# Patient Record
Sex: FEMALE | Race: BLACK OR AFRICAN AMERICAN | Employment: PART TIME | URBAN - METROPOLITAN AREA
[De-identification: names, ages, dates, MRNs, and addresses within clinical notes are randomized per-mention and may not be internally consistent; named-entity substitution may affect disease eponyms.]

---

## 2017-02-24 ENCOUNTER — OFFICE VISIT (OUTPATIENT)
Dept: OPHTHALMOLOGY | Facility: CLINIC | Age: 37
End: 2017-02-24

## 2017-02-24 DIAGNOSIS — H40.1131 PRIMARY OPEN ANGLE GLAUCOMA OF BOTH EYES, MILD STAGE: ICD-10-CM

## 2017-02-24 DIAGNOSIS — H52.13 MYOPIA, BILATERAL: Primary | ICD-10-CM

## 2017-02-24 ASSESSMENT — GONIOSCOPY
OD_INFERIOR: 4
OD_SUPERIOR: 4
OD_TEMPORAL: 4
OD_NASAL: 4
ADDITIONAL_COMMENTS: TR TMP OU
OS_NASAL: 4
OS_SUPERIOR: 4
OS_INFERIOR: 4
OS_TEMPORAL: 4

## 2017-02-24 ASSESSMENT — VISUAL ACUITY
OD_CC+: -2
OD_SC: 20/25
METHOD: SNELLEN - LINEAR
METHOD: SNELLEN - LINEAR
OS_SC+: -2
OS_CC: 20/20
OS_SC: 20/20
OD_CC: 20/20

## 2017-02-24 ASSESSMENT — CONF VISUAL FIELD
METHOD: COUNTING FINGERS
OD_NORMAL: 1
OS_NORMAL: 1

## 2017-02-24 ASSESSMENT — REFRACTION_MANIFEST
OS_AXIS: 180
OD_CYLINDER: +0.25
OS_CYLINDER: +0.25
OD_AXIS: 180
OS_SPHERE: -0.50
OD_SPHERE: -1.00

## 2017-02-24 ASSESSMENT — EXTERNAL EXAM - LEFT EYE: OS_EXAM: NORMAL

## 2017-02-24 ASSESSMENT — TONOMETRY
OD_IOP_MMHG: 22
OS_IOP_MMHG: 21
IOP_METHOD: APPLANATION

## 2017-02-24 ASSESSMENT — SLIT LAMP EXAM - LIDS
COMMENTS: NORMAL
COMMENTS: NORMAL

## 2017-02-24 ASSESSMENT — PACHYMETRY
OS_CT(UM): 604
OD_CT(UM): 612

## 2017-02-24 ASSESSMENT — EXTERNAL EXAM - RIGHT EYE: OD_EXAM: NORMAL

## 2017-02-24 ASSESSMENT — CUP TO DISC RATIO
OS_RATIO: 0.25
OD_RATIO: 0.25

## 2017-02-24 NOTE — PATIENT INSTRUCTIONS
Patient will return to clinic in 8-12 months with repeat visual field test, dilated eye exam and OCT with RNFL analysis.

## 2017-02-24 NOTE — MR AVS SNAPSHOT
After Visit Summary   2017    Ailyn Dennis    MRN: [id number]           Patient Information     Date Of Birth          1980        Visit Information        Provider Department      2017 9:15 AM Ro House MD [facility]        Today's Diagnoses     Myopia, bilateral    -  1    Primary open angle glaucoma of both eyes, mild stage          Care Instructions    Patient will return to clinic in 8-12 months with repeat visual field test, dilated eye exam and OCT with RNFL analysis.          Follow-ups after your visit        Who to contact     Please call your clinic at [phone] to:    Ask questions about your health    Make or cancel appointments    Discuss your medicines    Learn about your test results    Speak to your doctor   If you have compliments or concerns about an experience at your clinic, or if you wish to file a complaint, please contact [facility] Patient Relations at [phone] or email us at [email or web address]         Additional Information About Your Visit        MyChart Information     Beyond[employer] is an electronic gateway that provides easy, online access to your medical records. With BeyondCore, you can request a clinic appointment, read your test results, renew a prescription or communicate with your care team.     To sign up for BeyondCore visit the website at [email or web address]   You will be asked to enter the access code listed below, as well as some personal information. Please follow the directions to create your username and password.     Your access code is: [email or web address]  Expires: 2017 11:01 AM     Your access code will  in 90 days. If you need help or a new code, please contact your [facility] or call [phone] for assistance.        Care EveryWhere ID     This is your Care EveryWhere ID. This could be used by other organizations to  access your [place] medical records  [id number]         Blood Pressure from Last 3 Encounters:   No data found for BP    Weight from Last 3 Encounters:   No data found for Wt              We Performed the Following     GONIOSCOPY     OCT Optic Nerve RNFL Spectralis OU (both eyes)     OVF 24-2 Dynamic OU     Pachymetry OU (both eyes)     REFRACTION        Primary Care Provider Office Phone # Fax #    Luigi Lujan [phone]       [facility] [address] [facility] [address]        Thank you!     Thank you for choosing [facility]  for your care. Our goal is always to provide you with excellent care. Hearing back from our patients is one way we can continue to improve our services. Please take a few minutes to complete the written survey that you may receive in the mail after your visit with us. Thank you!             Your Updated Medication List - Protect others around you: Learn how to safely use, store and throw away your medicines at www.disposemymeds.org.          This list is accurate as of: 2/24/17 12:41 PM.  Always use your most recent med list.                   Brand Name Dispense Instructions for use    dorzolamide 2 % ophthalmic solution    TRUSOPT     1 drop 2 times daily       dorzolamide-timolol 2-0.5 % ophthalmic solution    COSOPT    1 Bottle    Place 1 drop into both eyes 2 times daily       GABAPENTIN PO          hydrochlorothiazide 12.5 MG capsule    MICROZIDE     Take 12.5 mg by mouth daily       IMITREX PO          latanoprost 0.005 % ophthalmic solution    XALATAN    1.5 mL    Place 1 drop into both eyes At Bedtime       TRAZODONE HCL PO

## 2017-02-24 NOTE — PROGRESS NOTES
1)POAG -- s/p SLT OU in 2014, H/O noncompliance with visits and drops  -- K pachy: 612/604   Tmax:30/29     HVF: Full      CDR:  0.25/0.25   HRT/OCT:RNFL WNL       FHX of Glc:father -- vision loss from glaucoma -- on gtts      Gonio: open      Intolerant to:      Asthma/COPD:No   Steroid Use: No    Kidney Stones: No    Sulfa Allergy:  No    IOP targets:L20s -- IOP good off drops     Patient will return to clinic in 8-12 months with repeat visual field test, dilated eye exam and OCT with RNFL analysis.  Dicussed importance of glaucoma surveillance.        Attending Physician Attestation:  Complete documentation of historical and exam elements from today's encounter can be found in the full encounter summary report (not reduplicated in this progress note). I personally obtained the chief complaint(s) and history of present illness.  I confirmed and edited as necessary the review of systems, past medical/surgical history, family history, social history, and examination findings as documented by others; and I examined the patient myself. I personally reviewed the relevant tests, images, and reports as documented above. I formulated and edited as necessary the assessment and plan and discussed the findings and management plan with the patient and family.  - Ro House MD 3:41 PM 3/29/2017

## 2017-02-24 NOTE — NURSING NOTE
Chief Complaints and History of Present Illnesses   Patient presents with     Glaucoma Follow Up     Glaucoma follow up     HPI    Symptoms:     No blurred vision   Floaters (Comment: maybe in RE)   No flashes   Itching (Comment: mild)            Comments:  Glaucoma follow up.  Glasses broken need to replace.  RE feels strained and weak.  Hasn't used glaucoma drops for a few months; hadn't been seen for a few years so needed exam today.    Gisel COLEMAN 10:13 AM 02/24/2017

## 2017-06-07 ENCOUNTER — TELEPHONE (OUTPATIENT)
Dept: OPHTHALMOLOGY | Facility: CLINIC | Age: 37
End: 2017-06-07

## 2017-06-07 NOTE — TELEPHONE ENCOUNTER
Patient calls to report eye discharge, pain x 2 days. Reports vision is slightly blurry. The discharge is stringy, and she noticed a tender/hard nodule on her eyelid. Discussed using warm compresses for symptom relief. Will have patient come to clinic tomorrow.     Angel Dash MD

## 2017-06-08 ENCOUNTER — OFFICE VISIT (OUTPATIENT)
Dept: OPHTHALMOLOGY | Facility: CLINIC | Age: 37
End: 2017-06-08

## 2017-06-08 DIAGNOSIS — H00.024 HORDEOLUM INTERNUM OF LEFT UPPER EYELID: Primary | ICD-10-CM

## 2017-06-08 RX ORDER — NARATRIPTAN 1 MG/1
TABLET ORAL
COMMUNITY
Start: 2017-04-20

## 2017-06-08 RX ORDER — AMITRIPTYLINE HYDROCHLORIDE 50 MG/1
100 TABLET ORAL
COMMUNITY
Start: 2017-03-15

## 2017-06-08 RX ORDER — HYDROCHLOROTHIAZIDE 25 MG/1
25 TABLET ORAL
COMMUNITY
Start: 2014-10-20

## 2017-06-08 RX ORDER — SUMATRIPTAN 25 MG/1
25 TABLET, FILM COATED ORAL
COMMUNITY
Start: 2014-06-25

## 2017-06-08 RX ORDER — TRAZODONE HYDROCHLORIDE 50 MG/1
25-50 TABLET, FILM COATED ORAL
COMMUNITY
Start: 2016-09-27

## 2017-06-08 ASSESSMENT — VISUAL ACUITY
OD_SC: 20/20
OS_SC: 20/20
METHOD: SNELLEN - LINEAR

## 2017-06-08 ASSESSMENT — TONOMETRY
OD_IOP_MMHG: 21
OS_IOP_MMHG: 19
IOP_METHOD: ICARE

## 2017-06-08 ASSESSMENT — EXTERNAL EXAM - LEFT EYE: OS_EXAM: NORMAL

## 2017-06-08 ASSESSMENT — CONF VISUAL FIELD
OS_NORMAL: 1
OD_NORMAL: 1

## 2017-06-08 ASSESSMENT — SLIT LAMP EXAM - LIDS: COMMENTS: 1+ MGD

## 2017-06-08 ASSESSMENT — CUP TO DISC RATIO
OS_RATIO: 0.25
OD_RATIO: 0.25

## 2017-06-08 ASSESSMENT — EXTERNAL EXAM - RIGHT EYE: OD_EXAM: NORMAL

## 2017-06-08 NOTE — MR AVS SNAPSHOT
After Visit Summary   2017    Ailyn Dennis    MRN: [id number]           Patient Information     Date Of Birth          1980        Visit Information        Provider Department      2017 1:20 PM Mateo Alexander OD [facility]        Today's Diagnoses     Hordeolum internum of left upper eyelid    -  1       Follow-ups after your visit        Follow-up notes from your care team     Return in about 1 month (around 2017), or if symptoms worsen or fail to improve, for Follow Up.      Who to contact     Please call your clinic at [phone] to:    Ask questions about your health    Make or cancel appointments    Discuss your medicines    Learn about your test results    Speak to your doctor   If you have compliments or concerns about an experience at your clinic, or if you wish to file a complaint, please contact [facility] Patient Relations at [phone] or email us at [email or web address]         Additional Information About Your Visit        MyChart Information     [employer] is an electronic gateway that provides easy, online access to your medical records. With [employer], you can request a clinic appointment, read your test results, renew a prescription or communicate with your care team.     To sign up for BAROnovat visit the website at [email or web address]   You will be asked to enter the access code listed below, as well as some personal information. Please follow the directions to create your username and password.     Your access code is: [email or web address]  Expires: 2017  2:15 PM     Your access code will  in 90 days. If you need help or a new code, please contact your [facility] or call [phone] for assistance.        Care EveryWhere ID     This is your Care EveryWhere ID. This could be used by other organizations to access your [facility]  records  [id number]         Blood Pressure from Last 3 Encounters:   No data found for BP    Weight from Last 3 Encounters:   No data found for Wt              Today, you had the following     No orders found for display       Primary Care Provider Office Phone # Fax #    Luigi Lujan [phone]       [facility] [address]  [facility] [address]        Thank you!     Thank you for choosing [facility]  for your care. Our goal is always to provide you with excellent care. Hearing back from our patients is one way we can continue to improve our services. Please take a few minutes to complete the written survey that you may receive in the mail after your visit with us. Thank you!             Your Updated Medication List - Protect others around you: Learn how to safely use, store and throw away your medicines at www.disposemymeds.org.          This list is accurate as of: 6/8/17  2:15 PM.  Always use your most recent med list.                   Brand Name Dispense Instructions for use    amitriptyline 50 MG tablet    ELAVIL     Take 100 mg by mouth       * hydrochlorothiazide 12.5 MG capsule    MICROZIDE     Take 12.5 mg by mouth daily       * hydrochlorothiazide 25 MG tablet    HYDRODIURIL     Take 25 mg by mouth       * IMITREX PO          * SUMAtriptan 25 MG tablet    IMITREX     Take 25 mg by mouth       naratriptan 1 MG tablet    AMERGE     Take 1 tablet by mouth at onset of headache. May repeat in 2 hrs. No more than 2 tabs daily. No more than 6 tabs/week.       * TRAZODONE HCL PO          * traZODone 50 MG tablet    DESYREL     Take 25-50 mg by mouth       * Notice:  This list has 6 medication(s) that are the same as other medications prescribed for you. Read the directions carefully, and ask your doctor or other care provider to review them with you.

## 2017-06-08 NOTE — LETTER
June 8, 2017      Re: Ailyn Dennis   1980    To Whom It May Concern:    This is to confirm that the above patient was seen on 6/8/2017.  Ailyn Dennis is able to return to work tomorrow.    She is being treated for a NON-infectious eye condition.    Thank you for your cooperation in this matter.  Please do not hesitate to contact me if you have any further questions.    Sincerely,    Mateo Alexander OD, FAAO

## 2017-06-08 NOTE — PROGRESS NOTES
Assessment/Plan  (H00.024) Hordeolum internum of left upper eyelid  (primary encounter diagnosis)  Comment: Newly symptomatic  Plan:  Educated patient on condition and clinical findings. Recommended warm compresses four times each day for ten minutes. Return to clinic in 1 month if symptoms are not improving, or sooner if symptoms worsen.   Continue management of glaucoma with Dr. House.      Complete documentation of historical and exam elements from today's encounter can  be found in the full encounter summary report (not reduplicated in this progress  note). I personally obtained the chief complaint(s) and history of present illness. I  confirmed and edited as necessary the review of systems, past medical/surgical  history, family history, social history, and examination findings as documented by  others; and I examined the patient myself. I personally reviewed the relevant tests,  images, and reports as documented above. I formulated and edited as necessary the  assessment and plan and discussed the findings and management plan with the  patient and family.    Mateo Alexander OD, FAAO

## 2018-11-15 ENCOUNTER — OFFICE VISIT (OUTPATIENT)
Dept: OPHTHALMOLOGY | Facility: CLINIC | Age: 38
End: 2018-11-15
Payer: COMMERCIAL

## 2018-11-15 DIAGNOSIS — H02.9 LESION OF LEFT EYELID: ICD-10-CM

## 2018-11-15 DIAGNOSIS — H10.13 ALLERGIC CONJUNCTIVITIS OF BOTH EYES: ICD-10-CM

## 2018-11-15 DIAGNOSIS — H52.13 MYOPIA OF BOTH EYES: Primary | ICD-10-CM

## 2018-11-15 DIAGNOSIS — H40.053 BORDERLINE GLAUCOMA OF BOTH EYES WITH OCULAR HYPERTENSION: ICD-10-CM

## 2018-11-15 DIAGNOSIS — H00.14 CHALAZION OF LEFT UPPER EYELID: ICD-10-CM

## 2018-11-15 RX ORDER — OLOPATADINE HYDROCHLORIDE 1 MG/ML
1 SOLUTION/ DROPS OPHTHALMIC 2 TIMES DAILY
Qty: 1 BOTTLE | Refills: 3 | Status: SHIPPED | OUTPATIENT
Start: 2018-11-15

## 2018-11-15 ASSESSMENT — VISUAL ACUITY
OS_CC: 20/20
CORRECTION_TYPE: GLASSES
METHOD: SNELLEN - LINEAR
OD_CC: 20/20

## 2018-11-15 ASSESSMENT — EXTERNAL EXAM - LEFT EYE: OS_EXAM: NORMAL

## 2018-11-15 ASSESSMENT — SLIT LAMP EXAM - LIDS: COMMENTS: 1+ MGD

## 2018-11-15 ASSESSMENT — REFRACTION_WEARINGRX
OD_SPHERE: -1.00
SPECS_TYPE: SVL
OS_AXIS: 180
OD_AXIS: 180
OS_SPHERE: -0.50
OS_CYLINDER: +0.25
OD_CYLINDER: +0.25

## 2018-11-15 ASSESSMENT — REFRACTION_MANIFEST
OS_AXIS: 180
OD_AXIS: 180
OD_CYLINDER: +0.50
OS_SPHERE: -0.75
OD_SPHERE: -1.00
OS_CYLINDER: +0.50

## 2018-11-15 ASSESSMENT — TONOMETRY
IOP_METHOD: ICARE
OD_IOP_MMHG: 30
IOP_METHOD: APPLANATION
OS_IOP_MMHG: 17
OS_IOP_MMHG: 31
OD_IOP_MMHG: 18

## 2018-11-15 ASSESSMENT — EXTERNAL EXAM - RIGHT EYE: OD_EXAM: NORMAL

## 2018-11-15 ASSESSMENT — CONF VISUAL FIELD
OD_NORMAL: 1
OS_NORMAL: 1

## 2018-11-15 ASSESSMENT — CUP TO DISC RATIO
OD_RATIO: 0.25
OS_RATIO: 0.25

## 2018-11-15 NOTE — PROGRESS NOTES
Assessment/Plan  (H52.13) Myopia of both eyes  (primary encounter diagnosis)  Comment: Myopia OU  Plan: REFRACTION [14745]         Educated patient on condition and clinical findings. Dispensed spectacle prescription for full time wear. Educated patient on possibility of adaptation period, if symptoms do not improve return to clinic for further testing.    (H40.053) Borderline glaucoma of both eyes with ocular hypertension  Comment: Pressures well controlled s/p SLT, ST RNFL thinning OU  Plan: OCT Optic Nerve RNFL Spectralis OU (both eyes)         Discussed findings with patient. Given that pressures are well-controlled, no indication for further topical therapy at this time. Patient expressed understanding. Return to clinic for 24-2 visual field testing.    (H10.13) Allergic conjunctivitis of both eyes  Comment: Symptomatic, lid puffiness  Plan: olopatadine (PATANOL) 0.1 % ophthalmic solution         Prescribed Patanol bid both eyes x 1 month, then taper as tolerated.    (H00.14) Chalazion of left upper eyelid  Comment: patient bothered, interested in excision  Plan:  Referred to Dr. Laura for consultation.    (H02.9) Lesion of left eyelid  Comment: Depigmentation of lid margin  Plan:  Referred to Dr. Laura for consultation.    Return to clinic in 1 month for technician only visual field.    Complete documentation of historical and exam elements from today's encounter can  be found in the full encounter summary report (not reduplicated in this progress  note). I personally obtained the chief complaint(s) and history of present illness. I  confirmed and edited as necessary the review of systems, past medical/surgical  history, family history, social history, and examination findings as documented by  others; and I examined the patient myself. I personally reviewed the relevant tests,  images, and reports as documented above. I formulated and edited as necessary the  assessment and plan and discussed  the findings and management plan with the  patient and family.    Mateo Alxeander OD, FAAO

## 2018-11-15 NOTE — MR AVS SNAPSHOT
After Visit Summary   11/15/2018    Ailyn Dennis    MRN: [id number]           Patient Information     Date Of Birth          1980        Visit Information        Provider Department      11/15/2018 10:00 AM Mateo Alexander, OD [facility]        Today's Diagnoses     Myopia of both eyes    -  1    Borderline glaucoma of both eyes with ocular hypertension        Allergic conjunctivitis of both eyes        Chalazion of left upper eyelid        Lesion of left eyelid           Follow-ups after your visit        Your next 10 appointments already scheduled     2018 10:00 AM CST   TECH with MM UMP MME TECH   [facility])    [facility]  [address] [address] [phone]            Dec 11, 2018 10:00 AM CST   (Arrive by 9:45 AM)   NEW PLASTICS with Yasmany Laura MD   [facility] ([facility])    [address]  4th [address]  [facility] [address] [phone]              Who to contact     Please call your clinic at [phone] to:    Ask questions about your health    Make or cancel appointments    Discuss your medicines    Learn about your test results    Speak to your doctor            Additional Information About Your Visit        MyChart Information     Happy [employer]t is an electronic gateway that provides easy, online access to your medical records. With AirPOS, you can request a clinic appointment, read your test results, renew a prescription or communicate with your care team.     To sign up for Happy Cloudt visit the website at [email or web address]et   You will be asked to enter the access code listed below, as well as some personal information. Please follow the directions to create your username and password.     Your access code is: [email or web address]  Expires: 2019  6:31 AM     Your access code will   in 90 days. If you need help or a new code, please contact your [facility] or call [phone] for assistance.        Care EveryWhere ID     This is your Care EveryWhere ID. This could be used by other organizations to access your [place] medical records  [id number]         Blood Pressure from Last 3 Encounters:   No data found for BP    Weight from Last 3 Encounters:   No data found for Wt              We Performed the Following     OCT Optic Nerve RNFL Spectralis OU (both eyes)     REFRACTION [49597]          Today's Medication Changes          These changes are accurate as of 11/15/18 11:59 PM.  If you have any questions, ask your nurse or doctor.               Start taking these medicines.        Dose/Directions    olopatadine 0.1 % ophthalmic solution   Commonly known as:  PATANOL   Used for:  Allergic conjunctivitis of both eyes   Started by:  Mateo Alexander OD        Dose:  1 drop   Place 1 drop into both eyes 2 times daily   Quantity:  1 Bottle   Refills:  3         These medicines have changed or have updated prescriptions.        Dose/Directions    hydrochlorothiazide 25 MG tablet   Commonly known as:  HYDRODIURIL   This may have changed:  Another medication with the same name was removed. Continue taking this medication, and follow the directions you see here.   Changed by:  Mateo Alexander OD        Dose:  25 mg   Take 25 mg by mouth   Refills:  0       traZODone 50 MG tablet   Commonly known as:  DESYREL   This may have changed:  Another medication with the same name was removed. Continue taking this medication, and follow the directions you see here.   Changed by:  Mateo Alexander, OD        Dose:  25-50 mg   Take 25-50 mg by mouth   Refills:  0            Where to get your medicines      These medications were sent to [facility] - [place], [place] - [address] [facility] [address]     Phone:  [phone]      olopatadine 0.1 % ophthalmic solution                Primary Care Provider Office Phone # Fax #    Luigi Lujan [phone]       [facility] [address] [facility] [address]        Equal Access to Services     BLAYNE MIMS : Hadii ari ku hadkarishmao Soomaali, waaxda luqadaha, qaybta kaalmada adeegyada, jaxson carmonan shad taylor laSpsharee [place]. So [facility] [phone].    ATENCIÓN: Si habla español, tiene a conteh disposición servicios gratuitos de asistencia lingüística. Llame al [phone].    We comply with applicable federal civil rights laws and [place] laws. We do not discriminate on the basis of race, color, national origin, age, disability, sex, sexual orientation, or gender identity.            Thank you!     Thank you for choosing [facility]  for your care. Our goal is always to provide you with excellent care. Hearing back from our patients is one way we can continue to improve our services. Please take a few minutes to complete the written survey that you may receive in the mail after your visit with us. Thank you!             Your Updated Medication List - Protect others around you: Learn how to safely use, store and throw away your medicines at www.disposemymeds.org.          This list is accurate as of 11/15/18 11:59 PM.  Always use your most recent med list.                   Brand Name Dispense Instructions for use Diagnosis    amitriptyline 50 MG tablet    ELAVIL     Take 100 mg by mouth        AMLODIPINE BESYLATE PO           hydrochlorothiazide 25 MG tablet    HYDRODIURIL     Take 25 mg by mouth        * IMITREX PO           * SUMAtriptan 25 MG tablet    IMITREX     Take 25 mg by mouth        LISINOPRIL PO      Take 40 mg by mouth        naratriptan 1 MG tablet    AMERGE     Take 1 tablet by mouth at onset of headache. May repeat in 2 hrs. No more than 2 tabs daily. No more than 6 tabs/week.        olopatadine 0.1 % ophthalmic solution     PATANOL    1 Bottle    Place 1 drop into both eyes 2 times daily    Allergic conjunctivitis of both eyes       traZODone 50 MG tablet    DESYREL     Take 25-50 mg by mouth        * Notice:  This list has 2 medication(s) that are the same as other medications prescribed for you. Read the directions carefully, and ask your doctor or other care provider to review them with you.

## 2018-11-27 NOTE — TELEPHONE ENCOUNTER
FUTURE VISIT INFORMATION      FUTURE VISIT INFORMATION:    Date: 12/11/18    Time: 1000am    Location: [facility]  REFERRAL INFORMATION:    Referring provider:  WENDY BUTCHER    Referring providers clinic:  [facility]    Reason for visit/diagnosis  Lesion/Chalazion inside Upper Left Lid

## 2018-12-11 ENCOUNTER — PRE VISIT (OUTPATIENT)
Dept: OPHTHALMOLOGY | Facility: CLINIC | Age: 38
End: 2018-12-11

## 2018-12-18 ENCOUNTER — TELEPHONE (OUTPATIENT)
Dept: OPHTHALMOLOGY | Facility: CLINIC | Age: 38
End: 2018-12-18

## 2018-12-21 ENCOUNTER — TELEPHONE (OUTPATIENT)
Dept: OPHTHALMOLOGY | Facility: CLINIC | Age: 38
End: 2018-12-21

## 2021-03-31 ENCOUNTER — HOSPITAL ENCOUNTER (EMERGENCY)
Facility: CLINIC | Age: 41
Discharge: HOME | End: 2021-03-31
Attending: EMERGENCY MEDICINE | Admitting: EMERGENCY MEDICINE

## 2021-03-31 ENCOUNTER — APPOINTMENT (OUTPATIENT)
Dept: CT IMAGING | Facility: CLINIC | Age: 41
End: 2021-03-31
Attending: EMERGENCY MEDICINE

## 2021-03-31 ENCOUNTER — APPOINTMENT (OUTPATIENT)
Dept: GENERAL RADIOLOGY | Facility: CLINIC | Age: 41
End: 2021-03-31
Attending: EMERGENCY MEDICINE

## 2021-03-31 VITALS
TEMPERATURE: 98.5 F | OXYGEN SATURATION: 100 % | HEIGHT: 69 IN | DIASTOLIC BLOOD PRESSURE: 124 MMHG | HEART RATE: 74 BPM | WEIGHT: 174.2 LBS | BODY MASS INDEX: 25.8 KG/M2 | SYSTOLIC BLOOD PRESSURE: 172 MMHG | RESPIRATION RATE: 18 BRPM

## 2021-03-31 DIAGNOSIS — I16.0 HYPERTENSIVE URGENCY: ICD-10-CM

## 2021-03-31 LAB
ALBUMIN SERPL-MCNC: 3.7 G/DL (ref 3.4–5)
ALBUMIN UR-MCNC: NEGATIVE MG/DL
ALP SERPL-CCNC: 90 U/L (ref 40–150)
ALT SERPL W P-5'-P-CCNC: 21 U/L (ref 0–50)
ANION GAP SERPL CALCULATED.3IONS-SCNC: 4 MMOL/L (ref 3–14)
APPEARANCE UR: CLEAR
AST SERPL W P-5'-P-CCNC: 16 U/L (ref 0–45)
BASOPHILS # BLD AUTO: 0 10E9/L (ref 0–0.2)
BASOPHILS NFR BLD AUTO: 0.6 %
BILIRUB SERPL-MCNC: 0.2 MG/DL (ref 0.2–1.3)
BILIRUB UR QL STRIP: NEGATIVE
BUN SERPL-MCNC: 5 MG/DL (ref 7–30)
CALCIUM SERPL-MCNC: 8.5 MG/DL (ref 8.5–10.1)
CHLORIDE SERPL-SCNC: 106 MMOL/L (ref 94–109)
CO2 SERPL-SCNC: 26 MMOL/L (ref 20–32)
COLOR UR AUTO: NORMAL
CREAT SERPL-MCNC: 0.73 MG/DL (ref 0.52–1.04)
DIFFERENTIAL METHOD BLD: NORMAL
EOSINOPHIL # BLD AUTO: 0.1 10E9/L (ref 0–0.7)
EOSINOPHIL NFR BLD AUTO: 1.9 %
ERYTHROCYTE [DISTWIDTH] IN BLOOD BY AUTOMATED COUNT: 13.7 % (ref 10–15)
GFR SERPL CREATININE-BSD FRML MDRD: >90 ML/MIN/{1.73_M2}
GLUCOSE SERPL-MCNC: 128 MG/DL (ref 70–99)
GLUCOSE UR STRIP-MCNC: NEGATIVE MG/DL
HCG SERPL QL: NEGATIVE
HCG UR QL: NEGATIVE
HCT VFR BLD AUTO: 43.4 % (ref 35–47)
HGB BLD-MCNC: 15.2 G/DL (ref 11.7–15.7)
HGB UR QL STRIP: NEGATIVE
IMM GRANULOCYTES # BLD: 0 10E9/L (ref 0–0.4)
IMM GRANULOCYTES NFR BLD: 0.2 %
INTERNAL QC OK POCT: YES
INTERPRETATION ECG - MUSE: NORMAL
KETONES UR STRIP-MCNC: NEGATIVE MG/DL
LACTATE BLD-SCNC: 1.2 MMOL/L (ref 0.7–2)
LEUKOCYTE ESTERASE UR QL STRIP: NEGATIVE
LIPASE SERPL-CCNC: 101 U/L (ref 73–393)
LYMPHOCYTES # BLD AUTO: 2.4 10E9/L (ref 0.8–5.3)
LYMPHOCYTES NFR BLD AUTO: 37.9 %
MCH RBC QN AUTO: 31.9 PG (ref 26.5–33)
MCHC RBC AUTO-ENTMCNC: 35 G/DL (ref 31.5–36.5)
MCV RBC AUTO: 91 FL (ref 78–100)
MONOCYTES # BLD AUTO: 0.5 10E9/L (ref 0–1.3)
MONOCYTES NFR BLD AUTO: 8.4 %
NEUTROPHILS # BLD AUTO: 3.3 10E9/L (ref 1.6–8.3)
NEUTROPHILS NFR BLD AUTO: 51 %
NITRATE UR QL: NEGATIVE
NRBC # BLD AUTO: 0 10*3/UL
NRBC BLD AUTO-RTO: 0 /100
PH UR STRIP: 6 PH (ref 5–7)
PLATELET # BLD AUTO: 351 10E9/L (ref 150–450)
POTASSIUM SERPL-SCNC: 3.2 MMOL/L (ref 3.4–5.3)
PROT SERPL-MCNC: 7.9 G/DL (ref 6.8–8.8)
RBC # BLD AUTO: 4.76 10E12/L (ref 3.8–5.2)
SODIUM SERPL-SCNC: 136 MMOL/L (ref 133–144)
SOURCE: NORMAL
SP GR UR STRIP: 1 (ref 1–1.03)
TROPONIN I SERPL-MCNC: <0.015 UG/L (ref 0–0.04)
UROBILINOGEN UR STRIP-MCNC: NORMAL MG/DL (ref 0–2)
WBC # BLD AUTO: 6.4 10E9/L (ref 4–11)

## 2021-03-31 PROCEDURE — 99285 EMERGENCY DEPT VISIT HI MDM: CPT | Mod: 25 | Performed by: EMERGENCY MEDICINE

## 2021-03-31 PROCEDURE — 93005 ELECTROCARDIOGRAM TRACING: CPT | Performed by: EMERGENCY MEDICINE

## 2021-03-31 PROCEDURE — 81003 URINALYSIS AUTO W/O SCOPE: CPT | Performed by: EMERGENCY MEDICINE

## 2021-03-31 PROCEDURE — 83605 ASSAY OF LACTIC ACID: CPT | Performed by: EMERGENCY MEDICINE

## 2021-03-31 PROCEDURE — 71046 X-RAY EXAM CHEST 2 VIEWS: CPT

## 2021-03-31 PROCEDURE — 96375 TX/PRO/DX INJ NEW DRUG ADDON: CPT | Performed by: EMERGENCY MEDICINE

## 2021-03-31 PROCEDURE — 250N000009 HC RX 250: Performed by: EMERGENCY MEDICINE

## 2021-03-31 PROCEDURE — 250N000011 HC RX IP 250 OP 636: Performed by: EMERGENCY MEDICINE

## 2021-03-31 PROCEDURE — 85025 COMPLETE CBC W/AUTO DIFF WBC: CPT | Performed by: EMERGENCY MEDICINE

## 2021-03-31 PROCEDURE — 96374 THER/PROPH/DIAG INJ IV PUSH: CPT | Performed by: EMERGENCY MEDICINE

## 2021-03-31 PROCEDURE — 83690 ASSAY OF LIPASE: CPT | Performed by: EMERGENCY MEDICINE

## 2021-03-31 PROCEDURE — 81025 URINE PREGNANCY TEST: CPT | Performed by: EMERGENCY MEDICINE

## 2021-03-31 PROCEDURE — 84484 ASSAY OF TROPONIN QUANT: CPT | Performed by: EMERGENCY MEDICINE

## 2021-03-31 PROCEDURE — 84703 CHORIONIC GONADOTROPIN ASSAY: CPT | Performed by: EMERGENCY MEDICINE

## 2021-03-31 PROCEDURE — 70450 CT HEAD/BRAIN W/O DYE: CPT

## 2021-03-31 PROCEDURE — 80053 COMPREHEN METABOLIC PANEL: CPT | Performed by: EMERGENCY MEDICINE

## 2021-03-31 PROCEDURE — 93010 ELECTROCARDIOGRAM REPORT: CPT | Performed by: EMERGENCY MEDICINE

## 2021-03-31 PROCEDURE — 250N000013 HC RX MED GY IP 250 OP 250 PS 637: Performed by: EMERGENCY MEDICINE

## 2021-03-31 RX ORDER — METOPROLOL TARTRATE 50 MG
50 TABLET ORAL 2 TIMES DAILY
Qty: 14 TABLET | Refills: 0 | Status: SHIPPED | OUTPATIENT
Start: 2021-03-31

## 2021-03-31 RX ORDER — HYDROCHLOROTHIAZIDE 12.5 MG/1
25 CAPSULE ORAL DAILY
Qty: 28 CAPSULE | Refills: 0 | Status: SHIPPED | OUTPATIENT
Start: 2021-03-31

## 2021-03-31 RX ORDER — POTASSIUM CHLORIDE 1500 MG/1
20 TABLET, EXTENDED RELEASE ORAL 2 TIMES DAILY
Qty: 28 TABLET | Refills: 0 | Status: SHIPPED | OUTPATIENT
Start: 2021-03-31

## 2021-03-31 RX ORDER — METOPROLOL TARTRATE 1 MG/ML
5 INJECTION, SOLUTION INTRAVENOUS ONCE
Status: COMPLETED | OUTPATIENT
Start: 2021-03-31 | End: 2021-03-31

## 2021-03-31 RX ORDER — LABETALOL HYDROCHLORIDE 5 MG/ML
20 INJECTION, SOLUTION INTRAVENOUS ONCE
Status: COMPLETED | OUTPATIENT
Start: 2021-03-31 | End: 2021-03-31

## 2021-03-31 RX ORDER — POTASSIUM CHLORIDE 750 MG/1
40 TABLET, EXTENDED RELEASE ORAL ONCE
Status: COMPLETED | OUTPATIENT
Start: 2021-03-31 | End: 2021-03-31

## 2021-03-31 RX ORDER — METOPROLOL TARTRATE 50 MG
50 TABLET ORAL DAILY
COMMUNITY

## 2021-03-31 RX ADMIN — LABETALOL HYDROCHLORIDE 20 MG: 5 INJECTION, SOLUTION INTRAVENOUS at 12:05

## 2021-03-31 RX ADMIN — POTASSIUM CHLORIDE 40 MEQ: 750 TABLET, EXTENDED RELEASE ORAL at 14:17

## 2021-03-31 RX ADMIN — METOPROLOL TARTRATE 5 MG: 1 INJECTION, SOLUTION INTRAVENOUS at 14:45

## 2021-03-31 ASSESSMENT — ENCOUNTER SYMPTOMS
NAUSEA: 0
ABDOMINAL PAIN: 0
SHORTNESS OF BREATH: 0
WEAKNESS: 0
HEADACHES: 0
EYES NEGATIVE: 1
VOMITING: 0
FLANK PAIN: 0
PSYCHIATRIC NEGATIVE: 1
FEVER: 0
NECK PAIN: 0
MUSCULOSKELETAL NEGATIVE: 1
TREMORS: 0
DIZZINESS: 1
SPEECH DIFFICULTY: 0

## 2021-03-31 ASSESSMENT — MIFFLIN-ST. JEOR: SCORE: 1529.55

## 2021-03-31 NOTE — ED NOTES
Patient complaining of new pain in back and shoulder. Patient previously received labetelol and BP came down after administration, pain started after BP back up into 170s/120s. MD notified. Patient asked to provide urine sample, stated she is not pregnant and doesn't need to go to the bathroom.

## 2021-03-31 NOTE — CONSULTS
Social Work Note    ANABELLA was consulted by Dr. Napoles - the pt was admitted to the ED because of BP issues.  The pt has reported that she cannot afford her medications because she does not have insurance. Dr. JALLOH was thinking about potentially admitting the pt to ED/OBS, but thinks that most likely the pt will decline because of lack of insurance.     SW spoke with the pt via room iPad. She relayed that she is on leave from work and thus her insurance is not active.  Her daughter is getting chemo treatments and she plans to go back to work within the month. The pt relayed that her insurance will then be active again, but she has not been able to afford the very expensive COBRA.    SW provided education about prescription assistance - SW spoke with the pt about going to [facility] pharmacy, as they generally charge lower prices for generic medications and you do NOT need a membership to access the pharmacy.  SW also provided education about [employer], a website that can be used for prescription coupons.  SW looked up a coupon for the pt's lisinopril BP medication and the coupons offered a 30 day supply between $5-15.  The pt was very happy to receive the information and education and denied having other needs.     Please contact ANABELLA should further questions arise.     DANIEL Arriola  [facility]  5 Ortho [address] 8A   Ph: [phone]  Pager: [phone]

## 2021-03-31 NOTE — ED PROVIDER NOTES
ED Provider Note  [facility]      History     Chief Complaint   Patient presents with     Hypertension     Had pain and tingling in left arm this morning. BP is high     The history is provided by the patient and medical records.     Ailyn Dennis is a 40 year old  female with a past medical history significant for hypertension, migraines and adjustment disorder who presents to the ED for evaluation of hypertension.  Earlier today, the patient was at the [facility] with her daughter (clinic at [facility]. Daughter is 18 and has Hodgkin's Lymphoma) and started to feel lightheaded with a tingling sensation down her left arm.  She also endorsed having tingling down her left arm in addition to lightheadedness and dizziness.  She describes it like falling asleep on her left arm.  She decided to ask a nursing assistant to take her blood pressure which showed 190/130s; she was then told to present to the emergency department for further checks..  In the ED, she had a blood pressure reading of 190/125.  She states that she will intermittently check her blood pressure but it is not very often.  The patient has not been on her blood pressure medication for a few weeks because she is on a leave of absence at work and her insurance has stopped.  She denies any chest pain, shortness of breath or fevers.  She states she used to be on hydrochlorothiazide, lisinopril, amlodipine, and metoprolol but has not been able to afford these medications and had been off them for several weeks    Social: +tobacco use, her daughter is a patient at [facility].  She later came to be with her mother in the ED. On leave of absence from work    Past Medical History  Past Medical History:   Diagnosis Date     Glaucoma      Hypertension      Past Surgical History:   Procedure Laterality Date     LASER SELECTIVE TRABECULOPLASTY Bilateral 2/16/12  "    hydrochlorothiazide (HYDRODIURIL) 25 MG tablet  hydrochlorothiazide (MICROZIDE) 12.5 MG capsule  metoprolol tartrate (LOPRESSOR) 50 MG tablet  metoprolol tartrate (LOPRESSOR) 50 MG tablet  potassium chloride ER (KLOR-CON M) 20 MEQ CR tablet  amitriptyline (ELAVIL) 50 MG tablet  AMLODIPINE BESYLATE PO  LISINOPRIL PO  naratriptan (AMERGE) 1 MG tablet  olopatadine (PATANOL) 0.1 % ophthalmic solution  SUMAtriptan (IMITREX) 25 MG tablet  SUMAtriptan Succinate (IMITREX PO)  traZODone (DESYREL) 50 MG tablet      Allergies   Allergen Reactions     Clindamycin Swelling     -Periorbital swelling.     Family History  Family History   Problem Relation Age of Onset     Colon Cancer Mother      Glaucoma Father      Diabetes Father      Glaucoma Paternal Grandmother      Diabetes Paternal Grandmother      Macular Degeneration No family hx of      Social History   Social History     Tobacco Use     Smoking status: Current Some Day Smoker     Packs/day: 0.50     Smokeless tobacco: Never Used   Substance Use Topics     Alcohol use: Not Currently     Drug use: Never      Past medical history, past surgical history, medications, allergies, family history, and social history were reviewed with the patient. No additional pertinent items.       Review of Systems   Constitutional: Negative for fever.   Eyes: Negative.    Respiratory: Negative for shortness of breath.    Cardiovascular: Negative for chest pain.   Gastrointestinal: Negative for abdominal pain, nausea and vomiting.   Genitourinary: Negative for flank pain.   Musculoskeletal: Negative.  Negative for neck pain.   Skin: Negative for rash.   Neurological: Positive for dizziness. Negative for tremors, speech difficulty, weakness and headaches.        Tingling in left arm   Psychiatric/Behavioral: Negative.    All other systems reviewed and are negative.        Physical Exam   BP: (!) 190/125  Pulse: 89  Temp: 98.5  F (36.9  C)  Resp: 18  Height: 175.3 cm (5' 9\")  Weight: 79 kg " (174 lb 3.2 oz)  SpO2: 100 %  Physical Exam  Physical Exam   Constitutional:   well nourished, well developed, resting comfortably   HENT:   Head: Normocephalic and atraumatic.   Eyes: Conjunctivae are normal. Pupils are equal, round, and reactive to light. Exceedingly long eyelashes  pharynx has no erythema or exudate, mucous membranes are moist  Neck:   no adenopathy, no bony tenderness  Cardiovascular: regular rate and rhythm without murmurs or gallops  Pulmonary/Chest: Clear to auscultation bilaterally, with no wheezes or retractions. No respiratory distress.  GI: Soft with good bowel sounds.  Non-tender, non-distended, with no guarding, no rebound, no peritoneal signs.   Back:  No bony or CVA tenderness   Musculoskeletal:  no edema  Skin: Skin is warm and dry. No rash noted.   Neurological: alert and oriented to person, place, and time. Nonfocal exam, CN 2-12 grossly intact,   NEUROLOGIC: speech normal, mental status intact, muscle tone normal, muscle strength normal, CN II-XII intact: face symmetric, facial sensation to light touch equal, symmetric cheek puff, eyebrow raise, palate elevation. PERRL, no nystagmus. EOMI. Tongue midline. Shoulder shrug strong and symmetric.  Motor: normal bulk/tone, no muscle wasting or fasciculations  No pronator drift able to do finger to nose accurately, heel to shin is normal, full  strength, full biceps and triceps strength, good distal pulses  Ambulates without difficulty  Psychiatric:  normal mood and affect.   ED Course       11:38 AM  The patient was seen and examined by Dawna Napoles MD in Room ED19.   Procedures             EKG Interpretation:      Interpreted by Dawna Napoles MD  Time reviewed: 1210 pm  Symptoms at time of EKG: see hpi   Rhythm: normal sinus   Rate: Normal  Axis: Normal  Ectopy: none  Conduction: Prolonged QT with a QTC of 486 ms  ST Segments/ T Waves: Non-specific ST-T wave changes  Q Waves: none  Comparison to prior: No old EKG  available    Clinical Impression: Normal sinus rhythm, rate of 83 bpm with prolonged QT and nonspecific ST-T wave changes                   Results for orders placed or performed during the hospital encounter of 03/31/21   Head CT w/o contrast     Status: None (Preliminary result)    Narrative    CT SCAN OF THE HEAD WITHOUT CONTRAST   3/31/2021 2:03 PM     HISTORY: Dizziness, non-specific; HTN off meds.    TECHNIQUE:  Axial images of the head and coronal reformations without  IV contrast material. Radiation dose for this scan was reduced using  automated exposure control, adjustment of the mA and/or kV according  to patient size, or iterative reconstruction technique.    COMPARISON: None.    FINDINGS: There is no evidence of intracranial hemorrhage, mass, acute  infarct or anomaly. The ventricles are normal in size, shape and  configuration. The brain parenchyma and subarachnoid spaces are  normal.     The visualized portions of the sinuses and mastoids appear normal. The  bony calvarium and bones of the skull base appear intact.       Impression    IMPRESSION:  No evidence of acute intracranial hemorrhage, mass, or  herniation.     XR Chest 2 Views     Status: None    Narrative    XR CHEST 2 VW 3/31/2021 1:59 PM    HISTORY: hypertension. Pain in upper back and shoulder    COMPARISON: None.      Impression    IMPRESSION: Clear lungs. No pleural effusion or pneumothorax. The  cardiac and mediastinal silhouettes are normal. On the lateral view,  possible defect in the mid sternum is favored to be artifactual due to  overlapping shadows and dedicated sternal radiograph could be  considered for better evaluation.    DUARTE URBANO MD   CBC with platelets differential     Status: None   Result Value Ref Range    WBC 6.4 4.0 - 11.0 10e9/L    RBC Count 4.76 3.8 - 5.2 10e12/L    Hemoglobin 15.2 11.7 - 15.7 g/dL    Hematocrit 43.4 35.0 - 47.0 %    MCV 91 78 - 100 fl    MCH 31.9 26.5 - 33.0 pg    MCHC 35.0 31.5 - 36.5 g/dL     RDW 13.7 10.0 - 15.0 %    Platelet Count 351 150 - 450 10e9/L    Diff Method Automated Method     % Neutrophils 51.0 %    % Lymphocytes 37.9 %    % Monocytes 8.4 %    % Eosinophils 1.9 %    % Basophils 0.6 %    % Immature Granulocytes 0.2 %    Nucleated RBCs 0 0 /100    Absolute Neutrophil 3.3 1.6 - 8.3 10e9/L    Absolute Lymphocytes 2.4 0.8 - 5.3 10e9/L    Absolute Monocytes 0.5 0.0 - 1.3 10e9/L    Absolute Eosinophils 0.1 0.0 - 0.7 10e9/L    Absolute Basophils 0.0 0.0 - 0.2 10e9/L    Abs Immature Granulocytes 0.0 0 - 0.4 10e9/L    Absolute Nucleated RBC 0.0    Comprehensive metabolic panel     Status: Abnormal   Result Value Ref Range    Sodium 136 133 - 144 mmol/L    Potassium 3.2 (L) 3.4 - 5.3 mmol/L    Chloride 106 94 - 109 mmol/L    Carbon Dioxide 26 20 - 32 mmol/L    Anion Gap 4 3 - 14 mmol/L    Glucose 128 (H) 70 - 99 mg/dL    Urea Nitrogen 5 (L) 7 - 30 mg/dL    Creatinine 0.73 0.52 - 1.04 mg/dL    GFR Estimate >90 >60 mL/min/[1.73_m2]    GFR Estimate If Black >90 >60 mL/min/[1.73_m2]    Calcium 8.5 8.5 - 10.1 mg/dL    Bilirubin Total 0.2 0.2 - 1.3 mg/dL    Albumin 3.7 3.4 - 5.0 g/dL    Protein Total 7.9 6.8 - 8.8 g/dL    Alkaline Phosphatase 90 40 - 150 U/L    ALT 21 0 - 50 U/L    AST 16 0 - 45 U/L   Lipase     Status: None   Result Value Ref Range    Lipase 101 73 - 393 U/L   Troponin I     Status: None   Result Value Ref Range    Troponin I ES <0.015 0.000 - 0.045 ug/L   Lactic acid whole blood     Status: None   Result Value Ref Range    Lactic Acid 1.2 0.7 - 2.0 mmol/L   UA reflex to Microscopic and Culture     Status: None    Specimen: Urine clean catch; Midstream Urine   Result Value Ref Range    Color Urine Straw     Appearance Urine Clear     Glucose Urine Negative NEG^Negative mg/dL    Bilirubin Urine Negative NEG^Negative    Ketones Urine Negative NEG^Negative mg/dL    Specific Gravity Urine 1.003 1.003 - 1.035    Blood Urine Negative NEG^Negative    pH Urine 6.0 5.0 - 7.0 pH    Protein Albumin  Urine Negative NEG^Negative mg/dL    Urobilinogen mg/dL Normal 0.0 - 2.0 mg/dL    Nitrite Urine Negative NEG^Negative    Leukocyte Esterase Urine Negative NEG^Negative    Source Midstream Urine    HCG qualitative     Status: None   Result Value Ref Range    HCG Qualitative Serum Negative NEG^Negative   EKG 12-lead, tracing only     Status: None   Result Value Ref Range    Interpretation ECG Click View Image link to view waveform and result    Care Management / Social Work IP Consult     Status: None ()    Fifi Guallpaloida SOUTH, MSW     3/31/2021  2:45 PM  Social Work Note    SW was consulted by Dr. Napoles - the pt was admitted to the ED   because of BP issues.  The pt has reported that she cannot afford   her medications because she does not have insurance. Dr. JALLOH was   thinking about potentially admitting the pt to ED/OBS, but thinks   that most likely the pt will decline because of lack of   insurance.     ANABELLA spoke with the pt via room iPad. She relayed that she is on   leave from work and thus her insurance is not active.  Her   daughter is getting chemo treatments and she plans to go back to   work within the month. The pt relayed that her insurance will   then be active again, but she has not been able to afford the   very expensive COBRA.    ANABELLA provided education about prescription assistance - ANABELLA spoke   with the pt about going to [facility] pharmacy, as they generally   charge lower prices for generic medications and you do NOT need a   membership to access the pharmacy.  ANABELLA also provided education   about [employer], a website that can be used for prescription   coupons.  SW looked up a coupon for the pt's lisinopril BP   medication and the coupons offered a 30 day supply between $5-15.    The pt was very happy to receive the information and education   and denied having other needs.     Please contact ANABELLA should further questions arise.     Fifi Dennis ANABELLA JALLOH [facility]  [facility] Ortho [address] 8A Social  Worker  Ph: [phone]  Pager: [phone]     hCG qual urine POCT     Status: Normal   Result Value Ref Range    HCG Qual Urine Negative neg    Internal QC OK Yes      Medications   metoprolol (LOPRESSOR) injection 5 mg (has no administration in time range)   labetalol (NORMODYNE/TRANDATE) injection 20 mg (20 mg Intravenous Given 3/31/21 1205)   potassium chloride ER (KLOR-CON M) CR tablet 40 mEq (40 mEq Oral Given 3/31/21 1417)        Assessments & Plan (with Medical Decision Making)       I have reviewed the nursing notes.   Emergency Department course:  The patient was seen and examined at 1133 am.   EKG shows Normal sinus rhythm, rate of 83 bpm with prolonged QT and nonspecific ST-T wave changes.  The patient's blood pressure was quite elevated here and I treated her with labetalol 20 mg IV.    At approximately 12:30 PM, I was informed that the patient was also complaining of upper back pain, at which time I ordered a Chest X-Ray.   Laboratory studies show an unremarkable CBC.  Comprehensive metabolic panel shows hypokalemia, with a potassium of 3.2.  Glucose is slightly elevated at 128.  The remainder of the CMP is unremarkable.  Lactate is normal at 1.2.  Lipase is 101.  Troponin I is less than 0.015.  UA is nitrite and LCE negative.  hCG is negative.  Head CT shows  IMPRESSION:  No evidence of acute intracranial hemorrhage, mass, or  Herniation.  PA and lateral CXR shows IMPRESSION: Clear lungs. No pleural effusion or pneumothorax. The cardiac and mediastinal silhouettes are normal. On the lateral view,  possible defect in the mid sternum is favored to be artifactual due to  overlapping shadows and dedicated sternal radiograph could be  considered for better evaluation.    I have independently reviewed all of today's laboratory studies and imaging studies.     I treated her with K. Dur p.o. for hypokalemia.  I had initially treated the patient with labetalol IV for hypertension.  This brought her blood  pressure down for short while.  Later I treated her with metoprolol IV.    The patient is a 40-year-old female with a history of hypertension, currently not taking any of her 4 antihypertensive medications, who presents with hypertension and left arm tingling.  She is neurologically intact with no acute objective findings.  Laboratory and radiographic studies are essentially unremarkable.  I believe the patient has longstanding hypertension complicated by medication noncompliance.  She was treated with IV labetalol and IV metoprolol, but her blood pressure remains elevated.    I offered the patient an observation admission for hypertensive urgency and further evaluation, such as a stress test.  This would be done in the ED observation unit on the [facility].  The patient did not want to be admitted to the OBS unit.  She states that she is caring for her daughter and giving her daughter all of her medications and feels she cannot come into the hospital..    I was quite concerned about the patient's elevated blood pressure.  She tells me she cannot afford her medications and she has been off her antihypertensive medications for several weeks.  She is on a leave of absence from her job and states her insurance will not cover her medications.  I contacted the ED  and had her discuss options with the patient.  It is very important that she start taking 1 or more of the medications that have been prescribed to her.    The patient will be discharged from the emergency department, as she does not want to be admitted at this time.  I discussed risks of untreated hypertension with the patient and the importance of restarting her antihypertensive medications.  In addition, she has hypokalemia and I gave her prescription for K-Dur.  I prescribed hydrochlorothiazide and metoprolol, 2 of the 4 medications the patient was on in the past.  She was given resources about lower cost medications.  It is important  that she follow-up with her regular physician for reevaluation, both of her blood pressure and her potassium level.  She should do so within 1 to 2 weeks.  I counseled the patient in my usual and standard fashion for hypertension and reasons to return to the Emergency Department.        I have reviewed the findings, diagnosis, plan and need for follow up with the patient.    New Prescriptions    HYDROCHLOROTHIAZIDE (MICROZIDE) 12.5 MG CAPSULE    Take 2 capsules (25 mg) by mouth daily    METOPROLOL TARTRATE (LOPRESSOR) 50 MG TABLET    Take 1 tablet (50 mg) by mouth 2 times daily    POTASSIUM CHLORIDE ER (KLOR-CON M) 20 MEQ CR TABLET    Take 1 tablet (20 mEq) by mouth 2 times daily       Final diagnoses:   Hypertensive urgency       --This note was created in part by the use of Dragon voice recognition dictation system. Inadvertent grammatical errors and typographical errors may still exist.    I, Rinku Carreon, am serving as a trained medical scribe to document services personally performed by Dawna Napoles MD, based on the provider's statements to me.     I, Dawna Napoles MD, was physically present and have reviewed and verified the accuracy of this note documented by Rinku Carreon.    Dawna Napoles MD  [facility] EMERGENCY DEPARTMENT  3/31/2021     Dawna Napoles MD  03/31/21 9350

## 2021-03-31 NOTE — DISCHARGE INSTRUCTIONS
Your blood pressure is very high today.  It is very important that you take your blood pressure medications as directed.  I have prescribed 2 of your blood pressure medications for you today.  You have talked to social work about affording these medications.  In addition, your potassium is low today.  You have been given medication for potassium replacement.  This will need to be rechecked in 2 to 3 weeks.  It is important that you follow-up with your doctor as soon as possible for reevaluation.  Untreated high blood pressure can damage your heart, lungs, brain, and kidneys.  It is very important that you follow-up and continue your treatment

## 2021-03-31 NOTE — ED NOTES
MD updated on pt discharge blood pressure reading 172/124, who said pt refused to stay for observation and she is ok to be discharged. Pt denies any numbness, tingling or headache.

## 2021-03-31 NOTE — ED TRIAGE NOTES
Pt was at an appt with her daughter this morning and started having an achyness and tingling in the left arm this morning. Pt's BP was high, was advised to be seen. Pt has a hx of HTN, has not taken all of her meds today.